# Patient Record
Sex: FEMALE | Race: WHITE | URBAN - METROPOLITAN AREA
[De-identification: names, ages, dates, MRNs, and addresses within clinical notes are randomized per-mention and may not be internally consistent; named-entity substitution may affect disease eponyms.]

---

## 2023-04-19 ENCOUNTER — REFRACTIVE (OUTPATIENT)
Facility: LOCATION | Age: 28
End: 2023-04-19

## 2023-04-19 DIAGNOSIS — H52.13 MYOPIA, BILATERAL: Primary | ICD-10-CM

## 2023-04-19 RX ORDER — OMEGA-3/DHA/EPA/FISH OIL 1000 MG
CAPSULE ORAL
Qty: 0 | Refills: 0 | Status: ACTIVE
Start: 2023-04-19

## 2023-04-19 ASSESSMENT — KERATOMETRY
OS: 44.88
OD: 44.69

## 2023-04-19 ASSESSMENT — VISUAL ACUITY
OD: 20/20
OS: 20/20

## 2023-04-19 ASSESSMENT — INTRAOCULAR PRESSURE
OD: 14
OS: 14

## 2023-04-21 ENCOUNTER — POST-OPERATIVE VISIT (OUTPATIENT)
Facility: LOCATION | Age: 28
End: 2023-04-21

## 2023-04-21 DIAGNOSIS — Z48.810 ENCOUNTER FOR SURGICAL AFTERCARE FOLLOWING SURGERY ON A SENSE ORGAN: Primary | ICD-10-CM

## 2023-04-21 PROCEDURE — 99024 POSTOP FOLLOW-UP VISIT: CPT | Performed by: OPTOMETRIST

## 2023-04-21 NOTE — IMPRESSION/PLAN
Impression: S/P [employer]Design Lasik OU - 1 Day. Encounter for surgical aftercare following surgery on a sense organ  Z48.810. 
1 day s/p LASIK OU doing well Plan: Continue present medications / non-preserved artificial tears. RTC 1 week - check VA (OD,OS,OU) and exam. IF VA 20/30 OR LESS [place] AND MR AT 1 WEEK PO Once doing well pt to see Dr. Jeanette Hannon soon after for continued post-operative care. RTC PRN decrease VA, increase pain, redness, discharge, photophobia, flashes/floaters or other vision problems.

## 2023-04-24 ENCOUNTER — POST-OPERATIVE VISIT (OUTPATIENT)
Facility: LOCATION | Age: 28
End: 2023-04-24

## 2023-04-24 DIAGNOSIS — Z48.810 ENCOUNTER FOR SURGICAL AFTERCARE FOLLOWING SURGERY ON A SENSE ORGAN: Primary | ICD-10-CM

## 2023-04-24 PROCEDURE — 99024 POSTOP FOLLOW-UP VISIT: CPT | Performed by: OPTOMETRIST

## 2023-04-24 NOTE — IMPRESSION/PLAN
Impression: S/P I[employer] Lasik OU - 4 Days. Encounter for surgical aftercare following surgery on a sense organ  Z48.810.
 1 week s/p LASIK OU doing well Plan: d/c antibiotic Moxifloxacin and steroid Prednisolone 2-3 days. Continue non-preserved artificial tears q 2 hrs x 1 week. Then switch to bottled tears qid x 2 months then bid x 2 months. Patient to see Dr. Adi Mccarthy in 2-3 weeks for continued post-operative care and annual eye health and vision examination. RTC PRN decrease VA, increase pain, redness, discharge, photophobia, flashes/floaters or other vision problems.

## 2023-06-09 ENCOUNTER — POST-OPERATIVE VISIT (OUTPATIENT)
Facility: LOCATION | Age: 28
End: 2023-06-09

## 2023-06-09 DIAGNOSIS — Z48.810 ENCOUNTER FOR SURGICAL AFTERCARE FOLLOWING SURGERY ON A SENSE ORGAN: Primary | ICD-10-CM

## 2023-06-09 PROCEDURE — 99024 POSTOP FOLLOW-UP VISIT: CPT

## 2023-06-09 ASSESSMENT — KERATOMETRY
OS: 42.25
OD: 41.69

## 2023-06-09 NOTE — IMPRESSION/PLAN
Impression: S/P IDesign Lasik OU - 50 Days. Encounter for surgical aftercare following surgery on a sense organ  Z48.810. OD: 2+ coalesced spk OS: 1+ inf spk Plan: Continue artificial tears q2h. BLL hope plugs placed today without complications. RTC 1 month for dry eye f/u. Check VA (OD, OS, OU), [place]s, prism balance MR, and examination. RTC PRN decrease VA, increase pain, redness, discharge, photophobia, flashes/floaters or other vision problems.